# Patient Record
(demographics unavailable — no encounter records)

---

## 2024-10-22 NOTE — ASSESSMENT
[FreeTextEntry1] : Ms. Rae is 62 year old female originally from [place] with history of osteoporosis, osteoarthritis, fibromyalgia, rheumatoid arthritis, bipolar type I, hypothyroidism, diabetes who now presents to the office for a follow up pulmonary evaluation for allergy/ LPR/ JUDY/ Asthma - Active Asthma S/p URI 2/2023 # 1stable- thriving except reflux  -over weight -pulmonary disease  -mild asthma -cardiac disease  problem 1: Mild Asthma (controlled) -continue Symbicort 160 2 inhalations BID / Singulair 10 mg QHS / Ventolin 2 puffs Q6H, pre-exercise -Use nebulizer twice before the Symbicort; -continue Singulair 10 mg QHS -Asthma is believed to be caused by inherited (genetic) and environmental factor, but its exact cause is unknown. Asthma may be triggered by allergens, lung infections, or irritants in the air. Asthma triggers are different for each person.  problem 2: Allergy/sinuses -complete blood work to include: asthma panel, food IgE panel, IgE level, eosinophil level, vitamin D level (NC) -continue Qnasl 1 sniff BID -continue Claritin 10 mg QAM -Environmental measures for allergies were encouraged including mattress and pillow covers, air purifier, and environmental controls.  Problem 3:LPR controlled with diet -continue Pepcid 40 mg QHS - off  -Rule of 2s: avoid eating too much, eating too late, eating too spicy, eating two hours before bed. -Things to avoid including overeating, spicy foods, tight clothing, eating within three hours of bed, this list is not all inclusive. -For treatment of reflux, possible options discussed including diet control, H2 blockers, PPIs, as well as coating motility agents discussed as treatment options. Timing of meals and proximity of last meal to sleep were discussed. If symptoms persist, a formal gastrointestinal evaluation is needed.   Problem 4: JUDY (refused Rx) -recommended DD (Danoff/ Fan) -Sleep apnea is associated with adverse clinical consequences which can affect most organ systems. Cardiovascular disease risk includes arrhythmias, atrial fibrillation, hypertension, coronary artery disease, and stroke. Metabolic disorders include diabetes type 2, non-alcoholic fatty liver disease. Mood disorder especially depression; and cognitive decline especially in the elderly. Associations with chronic reflux/Luz's esophagus some but not all inclusive. -Reasons include arousal consistent with hypopnea; respiratory events most prominent in REM sleep or supine position; therefore sleep staging and body position are important for accurate diagnosis and estimation of AHI.  problem 6 : cardiac disease -recommended to continue to follow up with Cardiologist if needed  problem 7 : overweight/ out of shape (improved) -Weight loss, exercise, and diet control were discussed and are highly encouraged. Treatment options are given such as, aqua therapy, and contacting a nutritionist. Recommended to use the elliptical, stationary bike, less use of treadmill.  problem 8 : health maintenance -recommended yearly flu shot after October 15 refused -recommended strep pneumonia vaccines: Prevnar-20 vaccine, followed by Pneumo vaccine 23 one year following after 65 years old. -recommended early intervention for Upper Respiratory Infections (URIs) -recommended regular osteoporosis evaluations -recommended early dermatological evaluations -recommended after the age of 50 to the age of 70, colonoscopy every 5 years  F/U in 6 months with Dr. Patrick with PFTs  She is encouraged to call with any changes, concerns, or questions.

## 2024-10-22 NOTE — PROCEDURE
[FreeTextEntry1] : 10/22/24 PFT - FEV1/%, TLC 82.9%, DLCO 90%  5/3/2024 PFTs revealed normal flows; FEV1 was 1.82 L, which is 85.3% of predicted; normal flow volume loop. PFTs were performed to evaluate for SOB  FENO was 21; a normal value being less than 25 Fractional exhaled nitric oxide (FENO) is regarded as a simple, noninvasive method for assessing eosinophilic airway inflammation. Produced by a variety of cells within the lung, nitric oxide (NO) concentrations are generally low in healthy individuals. However, high concentrations of NO appear to be involved in nonspecific host defense mechanisms and chronic inflammatory diseases such as asthma. The American Thoracic Society (ATS) therefore has recommended using FENO to aid in the diagnosis and monitoring of eosinophilic airway inflammation and asthma, and for identifying steroid responsive individuals whose chronic respiratory symptoms may be caused by airway inflammation.

## 2024-10-22 NOTE — HISTORY OF PRESENT ILLNESS
[TextBox_4] : 5/3/2024 Ms. Rae is 60 year old female with history of osteoporosis, osteoarthritis, fibromyalgia, rheumatoid arthritis, bipolar type I, hypothyroidism, diabetes who now presents to the office for a follow up evaluation. -she notes using Singulair -she denies COVID-19 -she notes losing sense of smell -she notes exercising (walking) -she notes not getting sick this winter for the first time in a while -she notes recent Turbinate reduction -she notes blood sugar is elevated (7.3) - taking Metformin -she notes reflux and digestion issues -she notes nausea   -she denies any headaches, emesis, fever, chills, sweats, chest pain, chest pressure, coughing, wheezing, palpitations, diarrhea, constipation, dysphagia, vertigo, arthralgias, myalgias, leg swelling, itchy eyes, itchy ears, or sour taste in the mouth.  TODAY'S VISIT 10/22/2024  Ms. Rae is 61 year old female with history of osteoporosis, osteoarthritis, fibromyalgia, rheumatoid arthritis, bipolar type I, hypothyroidism, diabetes who now presents to the office for a follow up evaluation.  -reports she is in her normal state of health, no new complaints or recent illness -reports she had a bad cold in September went to UC was prescribed antibiotics -reports her  weight and appetite are good -reports she has allergies this time of year -reports A1C 7.2 on Metformin -reports reflux controlled   -she denies any headaches, emesis, fever, chills, sweats, chest pain, chest pressure, coughing, wheezing, palpitations, diarrhea, constipation, dysphagia, vertigo, arthralgias, myalgias, leg swelling, itchy eyes, itchy ears, or sour taste in the mouth.

## 2025-04-28 NOTE — HISTORY OF PRESENT ILLNESS
[TextBox_4] : Ms. Rae is 63-year-old female with history of osteoporosis, osteoarthritis, fibromyalgia, rheumatoid arthritis, bipolar type I, hypothyroidism, diabetes who now presents to the office for a follow-up pulmonary evaluation. Her chief complaint is  -she notes she had a URI, for which she took an ABx, Symbicort, and Singulair -she notes she never tried using the nebulizer -she denies chest pain and chest pressure  -she notes constipation but denies diarrhea, unchanged from her baseline. -she notes tinnitus, but was told it can't be fixed -she notes Lorazepam helps her sleep -she notes sleeping for 8-9 hours, but she wakes up fatigued -she notes her energy levels are low -she notes exercising (walking) -she denies dysphonia  -she notes Singulair improves when she has mucus in her throat that impairs her breathing -she notes reflux. She's not on Pepcid  -she notes she took a cinnamon-based supplement that she saw in an ad to improve her blood sugar levels -she notes she's on a B vitamin complex and Mg. She doesn't feel any more energized with the B vitamin complex  -she denies any headaches, nausea, emesis, fever, chills, sweats, coughing, wheezing, palpitations, dysphagia, vertigo, arthralgias, myalgias, leg swelling, itchy eyes, itchy ears, or sour taste in the mouth.

## 2025-04-28 NOTE — ADDENDUM
[FreeTextEntry1] : Documented by Naomy Queen acting as a scribe for Dr. Samuel Patrick on 04/28/2025. All medical record entries made by the Scribe were at my, Dr. Samuel Patrick's, direction and personally dictated by me on 04/28/2025. I have reviewed the chart and agree that the record accurately reflects my personal performance of the history, physical exam, assessment and plan. I have also personally directed, reviewed, and agree with the discharge instructions.

## 2025-04-28 NOTE — ASSESSMENT
[FreeTextEntry1] : Ms. Rae is 63-year-old female originally from [place] with history of osteoporosis, osteoarthritis, fibromyalgia, rheumatoid arthritis, bipolar type I, hypothyroidism, diabetes- allergies/ LPR/ JUDY/ Asthma - Active Asthma S/p URI 2/2023 # 1stable- thriving except reflux (still)  Her shortness of breath is multifactorial due to: -poor mechanics of breathing -out of shape -over weight -pulmonary disease  -mild asthma -cardiac disease  problem 1: Mild Asthma (controlled) -continue Symbicort 160 2 inhalations BID -continue Ventolin 2 puffs Q6H, pre-exercise -continue Singulair 10 mg QHS -Asthma is believed to be caused by inherited (genetic) and environmental factor, but its exact cause is unknown. Asthma may be triggered by allergens, lung infections, or irritants in the air. Asthma triggers are different for each person. -Inhaler technique reviewed as well as oral hygiene techniques reviewed with patient. Avoidance of cold air, extremes of temperature; rescue inhaler should be used before exercise. Order of medication reviewed with patient. Recommended use of a cool mist humidifier in the bedroom.  problem 2: Allergy/sinuses -complete blood work: asthma panel, food IgE panel, IgE level, eosinophil level, vitamin D level (NC) -off Qnasl 1 sniff BID -continue Astelin 0.10% 1 sniff/nostril BID  -continue Claritin 10 mg QAM -Environmental measures for allergies were encouraged including mattress and pillow covers, air purifier, and environmental controls.  Problem 3:LPR (active) -restart Pepcid 40 mg QHS and Reflux Gourmet -Rule of 2s: avoid eating too much, eating too late, eating too spicy, eating two hours before bed. -Things to avoid including overeating, spicy foods, tight clothing, eating within three hours of bed, this list is not all inclusive. -For treatment of reflux, possible options discussed including diet control, H2 blockers, PPIs, as well as coating motility agents discussed as treatment options. Timing of meals and proximity of last meal to sleep were discussed. If symptoms persist, a formal gastrointestinal evaluation is needed.  Problem 4: JUDY (refused Rx) -recommended DD (Danoff/ Fan) -Sleep apnea is associated with adverse clinical consequences which can affect most organ systems. Cardiovascular disease risk includes arrhythmias, atrial fibrillation, hypertension, coronary artery disease, and stroke. Metabolic disorders include diabetes type 2, non-alcoholic fatty liver disease. Mood disorder especially depression; and cognitive decline especially in the elderly. Associations with chronic reflux/Luz's esophagus some but not all inclusive. -Reasons include arousal consistent with hypopnea; respiratory events most prominent in REM sleep or supine position; therefore sleep staging and body position are important for accurate diagnosis and estimation of AHI.  Problem 5: COVID-19 (2020) -recommended to wait to receive the updated booster until additional data regarding efficacy is available  problem 6 : cardiac disease -recommended to continue to follow up with Cardiologist if needed  problem 7 : poor breathing mechanics -Proper breathing techniques were reviewed with an emphasis of exhalation. Patient instructed to breath in for 1 second and out for four seconds. Patient was encouraged to not talk while walking.  problem 8 : overweight/ out of shape (improved) -recommended "10-Day Detox Diet" by Dr. Julius Fierro -Weight loss, exercise, and diet control were discussed and are highly encouraged. Treatment options are given such as, aqua therapy, and contacting a nutritionist. Recommended to use the elliptical, stationary bike, less use of treadmill.  problem 9 : health maintenance -refused yearly flu shot 2024 -recommended strep pneumonia vaccines: Prevnar-20 vaccine after 65 years old. -recommended early intervention for Upper Respiratory Infections (URIs) -recommended regular osteoporosis evaluations -recommended early dermatological evaluations -recommended after the age of 50 to the age of 70, colonoscopy every 5 years  F/P in 4 months She is encouraged to call with any changes, concerns, or questions.

## 2025-04-28 NOTE — PROCEDURE
[FreeTextEntry1] : Full PFT reveals normal flows; FEV1 was 1.89L which is 96 of predicted; normal lung volumes; normal diffusion at 15.74, which is 90% of predicted; normal flow volume loop. PFTs were performed to evaluate for SOB  FENO was 14; a normal value being less than 25 Fractional exhaled nitric oxide (FENO) is regarded as a simple, noninvasive method for assessing eosinophilic airway inflammation. Produced by a variety of cells within the lung, nitric oxide (NO) concentrations are generally low in healthy individuals. However, high concentrations of NO appear to be involved in nonspecific host defense mechanisms and chronic inflammatory diseases such as asthma. The American Thoracic Society (ATS) therefore has recommended using FENO to aid in the diagnosis and monitoring of eosinophilic airway inflammation and asthma, and for identifying steroid responsive individuals whose chronic respiratory symptoms may be caused by airway inflammation.